# Patient Record
Sex: FEMALE | Race: WHITE | HISPANIC OR LATINO
[De-identification: names, ages, dates, MRNs, and addresses within clinical notes are randomized per-mention and may not be internally consistent; named-entity substitution may affect disease eponyms.]

---

## 2019-01-01 ENCOUNTER — TRANSCRIPTION ENCOUNTER (OUTPATIENT)
Age: 54
End: 2019-01-01

## 2019-06-13 ENCOUNTER — OUTPATIENT (OUTPATIENT)
Dept: OUTPATIENT SERVICES | Facility: HOSPITAL | Age: 54
LOS: 1 days | Discharge: HOME | End: 2019-06-13
Payer: COMMERCIAL

## 2019-06-13 VITALS
HEIGHT: 60 IN | WEIGHT: 150.8 LBS | DIASTOLIC BLOOD PRESSURE: 70 MMHG | RESPIRATION RATE: 18 BRPM | HEART RATE: 77 BPM | OXYGEN SATURATION: 97 % | SYSTOLIC BLOOD PRESSURE: 136 MMHG | TEMPERATURE: 97 F

## 2019-06-13 DIAGNOSIS — N60.09 SOLITARY CYST OF UNSPECIFIED BREAST: Chronic | ICD-10-CM

## 2019-06-13 DIAGNOSIS — Z98.890 OTHER SPECIFIED POSTPROCEDURAL STATES: Chronic | ICD-10-CM

## 2019-06-13 DIAGNOSIS — Z01.818 ENCOUNTER FOR OTHER PREPROCEDURAL EXAMINATION: ICD-10-CM

## 2019-06-13 DIAGNOSIS — M66.232 SPONTANEOUS RUPTURE OF EXTENSOR TENDONS, LEFT FOREARM: ICD-10-CM

## 2019-06-13 LAB
ALBUMIN SERPL ELPH-MCNC: 4.3 G/DL — SIGNIFICANT CHANGE UP (ref 3.5–5.2)
ALP SERPL-CCNC: 105 U/L — SIGNIFICANT CHANGE UP (ref 30–115)
ALT FLD-CCNC: 40 U/L — SIGNIFICANT CHANGE UP (ref 0–41)
ANION GAP SERPL CALC-SCNC: 11 MMOL/L — SIGNIFICANT CHANGE UP (ref 7–14)
APTT BLD: 34.7 SEC — SIGNIFICANT CHANGE UP (ref 27–39.2)
AST SERPL-CCNC: 24 U/L — SIGNIFICANT CHANGE UP (ref 0–41)
BASOPHILS # BLD AUTO: 0.02 K/UL — SIGNIFICANT CHANGE UP (ref 0–0.2)
BASOPHILS NFR BLD AUTO: 0.5 % — SIGNIFICANT CHANGE UP (ref 0–1)
BILIRUB SERPL-MCNC: 0.5 MG/DL — SIGNIFICANT CHANGE UP (ref 0.2–1.2)
BUN SERPL-MCNC: 12 MG/DL — SIGNIFICANT CHANGE UP (ref 10–20)
CALCIUM SERPL-MCNC: 9.9 MG/DL — SIGNIFICANT CHANGE UP (ref 8.5–10.1)
CHLORIDE SERPL-SCNC: 102 MMOL/L — SIGNIFICANT CHANGE UP (ref 98–110)
CO2 SERPL-SCNC: 28 MMOL/L — SIGNIFICANT CHANGE UP (ref 17–32)
CREAT SERPL-MCNC: 0.7 MG/DL — SIGNIFICANT CHANGE UP (ref 0.7–1.5)
EOSINOPHIL # BLD AUTO: 0.1 K/UL — SIGNIFICANT CHANGE UP (ref 0–0.7)
EOSINOPHIL NFR BLD AUTO: 2.5 % — SIGNIFICANT CHANGE UP (ref 0–8)
GLUCOSE SERPL-MCNC: 96 MG/DL — SIGNIFICANT CHANGE UP (ref 70–99)
HCT VFR BLD CALC: 41.9 % — SIGNIFICANT CHANGE UP (ref 37–47)
HGB BLD-MCNC: 13.7 G/DL — SIGNIFICANT CHANGE UP (ref 12–16)
IMM GRANULOCYTES NFR BLD AUTO: 0.3 % — SIGNIFICANT CHANGE UP (ref 0.1–0.3)
INR BLD: 1.11 RATIO — SIGNIFICANT CHANGE UP (ref 0.65–1.3)
LYMPHOCYTES # BLD AUTO: 1.02 K/UL — LOW (ref 1.2–3.4)
LYMPHOCYTES # BLD AUTO: 25.9 % — SIGNIFICANT CHANGE UP (ref 20.5–51.1)
MCHC RBC-ENTMCNC: 32.5 PG — HIGH (ref 27–31)
MCHC RBC-ENTMCNC: 32.7 G/DL — SIGNIFICANT CHANGE UP (ref 32–37)
MCV RBC AUTO: 99.3 FL — HIGH (ref 81–99)
MONOCYTES # BLD AUTO: 0.33 K/UL — SIGNIFICANT CHANGE UP (ref 0.1–0.6)
MONOCYTES NFR BLD AUTO: 8.4 % — SIGNIFICANT CHANGE UP (ref 1.7–9.3)
NEUTROPHILS # BLD AUTO: 2.46 K/UL — SIGNIFICANT CHANGE UP (ref 1.4–6.5)
NEUTROPHILS NFR BLD AUTO: 62.4 % — SIGNIFICANT CHANGE UP (ref 42.2–75.2)
NRBC # BLD: 0 /100 WBCS — SIGNIFICANT CHANGE UP (ref 0–0)
PLATELET # BLD AUTO: 214 K/UL — SIGNIFICANT CHANGE UP (ref 130–400)
POTASSIUM SERPL-MCNC: 4.5 MMOL/L — SIGNIFICANT CHANGE UP (ref 3.5–5)
POTASSIUM SERPL-SCNC: 4.5 MMOL/L — SIGNIFICANT CHANGE UP (ref 3.5–5)
PROT SERPL-MCNC: 7.1 G/DL — SIGNIFICANT CHANGE UP (ref 6–8)
PROTHROM AB SERPL-ACNC: 12.8 SEC — SIGNIFICANT CHANGE UP (ref 9.95–12.87)
RBC # BLD: 4.22 M/UL — SIGNIFICANT CHANGE UP (ref 4.2–5.4)
RBC # FLD: 12.1 % — SIGNIFICANT CHANGE UP (ref 11.5–14.5)
SODIUM SERPL-SCNC: 141 MMOL/L — SIGNIFICANT CHANGE UP (ref 135–146)
WBC # BLD: 3.94 K/UL — LOW (ref 4.8–10.8)
WBC # FLD AUTO: 3.94 K/UL — LOW (ref 4.8–10.8)

## 2019-06-13 PROCEDURE — 93010 ELECTROCARDIOGRAM REPORT: CPT

## 2019-06-13 PROCEDURE — 71046 X-RAY EXAM CHEST 2 VIEWS: CPT | Mod: 26

## 2019-06-13 NOTE — H&P PST ADULT - NSICDXPASTMEDICALHX_GEN_ALL_CORE_FT
PAST MEDICAL HISTORY:  Anxiety     Breast cancer Right s/p Lumpectomy and radx 7 months    Elevated liver enzymes     JEREMY on CPAP     Osteopenia

## 2019-06-13 NOTE — H&P PST ADULT - REASON FOR ADMISSION
Patient scheduled to PAST for left extensor carpal ulnaris release debridement under GA on 06/27/2019 at [facility] OR by DR. Phillips.

## 2019-06-13 NOTE — H&P PST ADULT - HISTORY OF PRESENT ILLNESS
Patient c/o left arm pain, numbness and tingling since 01/2019.   Patient denies any c/o cp, palpitations fever, cough, rashes or dysuria.  Ex tolerance of 2 FOS walk with out SOB.   Positive JEREMY.

## 2019-06-13 NOTE — H&P PST ADULT - NSICDXPASTSURGICALHX_GEN_ALL_CORE_FT
PAST SURGICAL HISTORY:  Benign cyst of breast bilateral    H/O removal of cyst left moddle finger    History of excision of pilonidal cyst     History of female sterilization     Status post right breast lumpectomy

## 2019-06-26 NOTE — ASU PATIENT PROFILE, ADULT - PSH
Benign cyst of breast  bilateral  H/O removal of cyst  left moddle finger  H/O tubal ligation    History of excision of pilonidal cyst    Status post right breast lumpectomy

## 2019-06-26 NOTE — ASU PATIENT PROFILE, ADULT - PMH
Anxiety    Breast cancer  Right s/p Lumpectomy and radx 7 months  Elevated liver enzymes    JEREMY on CPAP    Osteopenia

## 2019-06-27 ENCOUNTER — OUTPATIENT (OUTPATIENT)
Dept: OUTPATIENT SERVICES | Facility: HOSPITAL | Age: 54
LOS: 1 days | Discharge: HOME | End: 2019-06-27

## 2019-06-27 VITALS
DIASTOLIC BLOOD PRESSURE: 67 MMHG | SYSTOLIC BLOOD PRESSURE: 127 MMHG | TEMPERATURE: 99 F | RESPIRATION RATE: 18 BRPM | OXYGEN SATURATION: 98 % | HEART RATE: 74 BPM | WEIGHT: 150.8 LBS | HEIGHT: 60 IN

## 2019-06-27 VITALS
HEART RATE: 76 BPM | DIASTOLIC BLOOD PRESSURE: 86 MMHG | TEMPERATURE: 98 F | SYSTOLIC BLOOD PRESSURE: 139 MMHG | RESPIRATION RATE: 18 BRPM | OXYGEN SATURATION: 97 %

## 2019-06-27 DIAGNOSIS — N60.09 SOLITARY CYST OF UNSPECIFIED BREAST: Chronic | ICD-10-CM

## 2019-06-27 DIAGNOSIS — Z98.890 OTHER SPECIFIED POSTPROCEDURAL STATES: Chronic | ICD-10-CM

## 2019-06-27 DIAGNOSIS — M77.8 OTHER ENTHESOPATHIES, NOT ELSEWHERE CLASSIFIED: ICD-10-CM

## 2019-06-27 DIAGNOSIS — Z98.51 TUBAL LIGATION STATUS: Chronic | ICD-10-CM

## 2019-06-27 RX ORDER — MORPHINE SULFATE 50 MG/1
4 CAPSULE, EXTENDED RELEASE ORAL
Refills: 0 | Status: DISCONTINUED | OUTPATIENT
Start: 2019-06-27 | End: 2019-06-27

## 2019-06-27 RX ORDER — LETROZOLE 2.5 MG/1
1 TABLET, FILM COATED ORAL
Qty: 0 | Refills: 0 | DISCHARGE

## 2019-06-27 RX ORDER — PREGABALIN 225 MG/1
1 CAPSULE ORAL
Qty: 0 | Refills: 0 | DISCHARGE

## 2019-06-27 RX ORDER — SODIUM CHLORIDE 9 MG/ML
1000 INJECTION, SOLUTION INTRAVENOUS
Refills: 0 | Status: DISCONTINUED | OUTPATIENT
Start: 2019-06-27 | End: 2019-07-12

## 2019-06-27 RX ORDER — ERGOCALCIFEROL 1.25 MG/1
1 CAPSULE ORAL
Qty: 0 | Refills: 0 | DISCHARGE

## 2019-06-27 RX ORDER — OXYCODONE AND ACETAMINOPHEN 5; 325 MG/1; MG/1
2 TABLET ORAL ONCE
Refills: 0 | Status: DISCONTINUED | OUTPATIENT
Start: 2019-06-27 | End: 2019-06-27

## 2019-06-27 RX ORDER — ALPRAZOLAM 0.25 MG
1 TABLET ORAL
Qty: 0 | Refills: 0 | DISCHARGE

## 2019-06-27 RX ADMIN — OXYCODONE AND ACETAMINOPHEN 2 TABLET(S): 5; 325 TABLET ORAL at 16:47

## 2019-06-27 RX ADMIN — MORPHINE SULFATE 4 MILLIGRAM(S): 50 CAPSULE, EXTENDED RELEASE ORAL at 15:09

## 2019-06-27 RX ADMIN — SODIUM CHLORIDE 100 MILLILITER(S): 9 INJECTION, SOLUTION INTRAVENOUS at 14:50

## 2019-06-27 RX ADMIN — MORPHINE SULFATE 4 MILLIGRAM(S): 50 CAPSULE, EXTENDED RELEASE ORAL at 15:49

## 2019-07-03 DIAGNOSIS — Z88.1 ALLERGY STATUS TO OTHER ANTIBIOTIC AGENTS STATUS: ICD-10-CM

## 2019-07-03 DIAGNOSIS — M66.232 SPONTANEOUS RUPTURE OF EXTENSOR TENDONS, LEFT FOREARM: ICD-10-CM

## 2019-11-18 PROBLEM — M85.80 OTHER SPECIFIED DISORDERS OF BONE DENSITY AND STRUCTURE, UNSPECIFIED SITE: Chronic | Status: ACTIVE | Noted: 2019-06-13

## 2019-11-18 PROBLEM — R74.8 ABNORMAL LEVELS OF OTHER SERUM ENZYMES: Chronic | Status: ACTIVE | Noted: 2019-06-13

## 2019-11-18 PROBLEM — G47.33 OBSTRUCTIVE SLEEP APNEA (ADULT) (PEDIATRIC): Chronic | Status: ACTIVE | Noted: 2019-06-13

## 2019-11-18 PROBLEM — F41.9 ANXIETY DISORDER, UNSPECIFIED: Chronic | Status: ACTIVE | Noted: 2019-06-13

## 2019-11-18 PROBLEM — C50.919 MALIGNANT NEOPLASM OF UNSPECIFIED SITE OF UNSPECIFIED FEMALE BREAST: Chronic | Status: ACTIVE | Noted: 2019-06-13

## 2019-12-04 PROBLEM — Z00.00 ENCOUNTER FOR PREVENTIVE HEALTH EXAMINATION: Status: ACTIVE | Noted: 2019-12-04

## 2019-12-20 ENCOUNTER — APPOINTMENT (OUTPATIENT)
Dept: GYNECOLOGIC ONCOLOGY | Facility: CLINIC | Age: 54
End: 2019-12-20
Payer: COMMERCIAL

## 2019-12-20 VITALS
SYSTOLIC BLOOD PRESSURE: 122 MMHG | TEMPERATURE: 99.5 F | WEIGHT: 160 LBS | DIASTOLIC BLOOD PRESSURE: 76 MMHG | BODY MASS INDEX: 31.41 KG/M2 | HEIGHT: 60 IN

## 2019-12-20 DIAGNOSIS — D25.1 INTRAMURAL LEIOMYOMA OF UTERUS: ICD-10-CM

## 2019-12-20 DIAGNOSIS — D25.2 INTRAMURAL LEIOMYOMA OF UTERUS: ICD-10-CM

## 2019-12-20 DIAGNOSIS — Z87.891 PERSONAL HISTORY OF NICOTINE DEPENDENCE: ICD-10-CM

## 2019-12-20 DIAGNOSIS — D25.0 INTRAMURAL LEIOMYOMA OF UTERUS: ICD-10-CM

## 2019-12-20 PROCEDURE — 99205 OFFICE O/P NEW HI 60 MIN: CPT

## 2019-12-20 NOTE — ASSESSMENT
[FreeTextEntry1] : 54-year-old female  ( x2) with a history of uterine fibroids along with a history of breast cancer diagnosed 2018. She was treated with lumpectomy by Dr. Shaw, followed by radiation. She denies any pain vaginal bleeding or discharge, and requests BRCA testing. She is referred by Dr. Vidal ([place]) for  second opinion with regards to her uterine fibroids. The patient's workup included serial sonography which reveals stable uterine fibroids from July to 2019.\par Options for surgical management discussed. Procedures reviewed with patient included laparoscopic hysterectomy with bilateral salpingo-oophorectomy along with conservative management.\par The patient was informed that at this time her fibroid uterus is not symptomatic and appears to be stable and hence followup only is sufficient. A  repeat sonogram in 6-12 months is thus recommended. However BRCA testing was also discussed. The patient is interested in going forward with genetic testing. In the event that she tests positive for a deleterious  mutation,  risk reducing surgery will be recommended. I would than favor a laparoscopic approach for removing the uterus and both tubes and ovaries. The patient was informed to f/u with me after her BRCA testing is  back.\par \par \par

## 2019-12-20 NOTE — HISTORY OF PRESENT ILLNESS
[FreeTextEntry1] : 54-year-old female  ( x2) with a history of uterine fibroids along with a history of breast cancer diagnosed 2018. She was treated with lumpectomy by Dr. Shaw, followed by radiation. She denies any pain vaginal bleeding or discharge, and requests BRCA testing. She is referred by Dr. Vidal ([place]) for second opinion with regards to her uterine fibroids.\par

## 2019-12-20 NOTE — OB HISTORY
[Vaginal ___] : [unfilled] vaginal delivery(s) [Total Preg ___] : : [unfilled] [Full Term ___] : [unfilled] (full-term) [Menarche Age ____] : age at menarche was [unfilled] [Menopause  Age ____] : menopause occurred at age [unfilled]

## 2020-01-06 ENCOUNTER — MESSAGE (OUTPATIENT)
Age: 55
End: 2020-01-06

## 2020-08-25 NOTE — PACU DISCHARGE NOTE - AIRWAY PATENCY:
[address] [facility]  [address] [place] [address]. [facility] [address]  Phone: [phone]  Fax: [address] , DO      August 25, 2020    Patient: Neel Aleman   YOB: 1980   Date of Visit: 8/25/2020       To Whom it May Concern:    Lori Robb was seen in my clinic on 8/25/2020. He should be off work 25 AUG 2020 through 27 AUG 2020. He may return to work on Friday, 28 AUG 2020. If you have any questions or concerns, please don't hesitate to call.       Sincerely,         Lamonte Persaud, 
Satisfactory

## 2021-01-12 ENCOUNTER — NON-APPOINTMENT (OUTPATIENT)
Age: 56
End: 2021-01-12

## 2021-01-12 LAB — CYTOLOGY CVX/VAG DOC THIN PREP: NORMAL

## 2021-01-21 ENCOUNTER — LABORATORY RESULT (OUTPATIENT)
Age: 56
End: 2021-01-21

## 2021-01-23 ENCOUNTER — APPOINTMENT (OUTPATIENT)
Dept: OBGYN | Facility: CLINIC | Age: 56
End: 2021-01-23
Payer: COMMERCIAL

## 2021-01-23 VITALS
BODY MASS INDEX: 28.71 KG/M2 | SYSTOLIC BLOOD PRESSURE: 114 MMHG | WEIGHT: 156 LBS | OXYGEN SATURATION: 98 % | TEMPERATURE: 98.3 F | HEART RATE: 81 BPM | HEIGHT: 62 IN | DIASTOLIC BLOOD PRESSURE: 70 MMHG

## 2021-01-23 LAB
BILIRUB UR QL STRIP: NEGATIVE
CLARITY UR: CLEAR
COLLECTION METHOD: NORMAL
GLUCOSE UR-MCNC: NEGATIVE
HCG UR QL: 0.2 EU/DL
HGB UR QL STRIP.AUTO: NEGATIVE
KETONES UR-MCNC: NEGATIVE
LEUKOCYTE ESTERASE UR QL STRIP: NEGATIVE
NITRITE UR QL STRIP: NEGATIVE
PH UR STRIP: 5
PROT UR STRIP-MCNC: NEGATIVE
SP GR UR STRIP: 1.03

## 2021-01-23 PROCEDURE — 81003 URINALYSIS AUTO W/O SCOPE: CPT | Mod: QW

## 2021-01-23 PROCEDURE — 99072 ADDL SUPL MATRL&STAF TM PHE: CPT

## 2021-01-23 PROCEDURE — 99396 PREV VISIT EST AGE 40-64: CPT

## 2021-01-23 NOTE — DISCUSSION/SUMMARY
[FreeTextEntry1] : Bi lat mammo with sono\par TVS to evaluate fibroids and endometrial stripe\par Pt advised to f/u with Dr Vasquez and Dr Dave r/t hx of R breast Ca and letrazole\par RTO 6 mos,prn

## 2021-01-26 ENCOUNTER — APPOINTMENT (OUTPATIENT)
Dept: SURGERY | Facility: CLINIC | Age: 56
End: 2021-01-26
Payer: COMMERCIAL

## 2021-01-26 VITALS
HEIGHT: 60 IN | TEMPERATURE: 96 F | HEART RATE: 84 BPM | OXYGEN SATURATION: 98 % | WEIGHT: 156 LBS | BODY MASS INDEX: 30.63 KG/M2

## 2021-01-26 VITALS
OXYGEN SATURATION: 98 % | DIASTOLIC BLOOD PRESSURE: 65 MMHG | BODY MASS INDEX: 30.04 KG/M2 | WEIGHT: 153 LBS | SYSTOLIC BLOOD PRESSURE: 130 MMHG | HEIGHT: 60 IN | HEART RATE: 86 BPM

## 2021-01-26 PROCEDURE — 99072 ADDL SUPL MATRL&STAF TM PHE: CPT

## 2021-01-26 PROCEDURE — 99213 OFFICE O/P EST LOW 20 MIN: CPT

## 2021-01-26 NOTE — PHYSICAL EXAM
[Normocephalic] : normocephalic [Atraumatic] : atraumatic [Supple] : supple [No Supraclavicular Adenopathy] : no supraclavicular adenopathy [Examined in the supine and seated position] : examined in the supine and seated position [Symmetrical] : symmetrical [No dominant masses] : no dominant masses in right breast  [No dominant masses] : no dominant masses left breast [No Nipple Retraction] : no left nipple retraction [No Nipple Discharge] : no left nipple discharge [Breast Mass Right Breast ___cm] : no masses [Breast Mass Left Breast ___cm] : no masses [Breast Nipple Inversion] : nipples not inverted [Breast Nipple Retraction] : nipples not retracted [Breast Nipple Fissures] : nipples not fissured [Breast Nipple Flattening] : nipples not flattened [Breast Abnormal Lactation (Galactorrhea)] : no galactorrhea [Breast Abnormal Secretion Bloody Fluid] : no bloody discharge [Breast Abnormal Secretion Serous Fluid] : no serous discharge [Breast Abnormal Secretion Opalescent Fluid] : no milky discharge [No Axillary Lymphadenopathy] : no left axillary lymphadenopathy [No Rashes] : no rashes [No Ulceration] : no ulceration

## 2021-01-26 NOTE — DATA REVIEWED
[FreeTextEntry1] : Patient's previous mammography reports were reviewed.  Patient is due for her mammogram and February of this year.  Previous pathology reports were noted.  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  The DCIS was very close to the superior margin.  The tumor was estrogen receptor positive and progesterone receptor negative.

## 2021-01-26 NOTE — PAST MEDICAL HISTORY
[Menarche Age ____] : age at menarche was [unfilled] [Menopause Age____] : age at menopause was [unfilled] [Total Preg ___] : G[unfilled] [Live Births ___] : P[unfilled]  [Abortions ___] : Abortions:[unfilled] [Age At Live Birth ___] : Age at live birth: [unfilled] [History of Hormone Replacement Treatment] : has no history of hormone replacement treatment

## 2021-01-26 NOTE — REVIEW OF SYSTEMS
[Negative] : Constitutional [Abdominal Pain] : no abdominal pain [Constipation] : no constipation [Diarrhea] : no diarrhea [Heartburn] : no heartburn

## 2021-01-26 NOTE — ASSESSMENT
[FreeTextEntry1] : After examination patient was explained about her pathology report from 2018 in detail.  She was explained about estrogen receptor and progesterone receptor as well.  She was explained about the superior margin being close to the specimen.  She will be seeing oncologist in couple of weeks.  Follow-up with oncologist explained to the patient.  Yearly mammography and possible MRI in the future explained as well.  Follow-up with oncologist and in the office as needed.

## 2021-01-26 NOTE — CONSULT LETTER
[Dear  ___] : Dear  [unfilled], [Courtesy Letter:] : I had the pleasure of seeing your patient, [unfilled], in my office today. [Please see my note below.] : Please see my note below. [Sincerely,] : Sincerely, [FreeTextEntry3] : Dani Vasquez MD, FACS\par [facility] [place] [facility] [place] [address]\par

## 2021-01-26 NOTE — HISTORY OF PRESENT ILLNESS
[FreeTextEntry1] : Patient presents to the office for the examination of the breast and discussion regarding the follow-up treatment of the right breast cancer.  Patient had surgery for right breast DCIS in 2018 after needle localization.  History of previous surgery on the right breast as well as left breast.  Patient is on medication for the DCIS.  She had radiation after the surgery as well.

## 2021-02-18 ENCOUNTER — APPOINTMENT (OUTPATIENT)
Dept: HEMATOLOGY ONCOLOGY | Facility: CLINIC | Age: 56
End: 2021-02-18

## 2021-02-22 ENCOUNTER — APPOINTMENT (OUTPATIENT)
Dept: HEMATOLOGY ONCOLOGY | Facility: CLINIC | Age: 56
End: 2021-02-22
Payer: COMMERCIAL

## 2021-02-22 ENCOUNTER — OUTPATIENT (OUTPATIENT)
Dept: OUTPATIENT SERVICES | Facility: HOSPITAL | Age: 56
LOS: 1 days | Discharge: HOME | End: 2021-02-22

## 2021-02-22 VITALS
WEIGHT: 152 LBS | HEIGHT: 60 IN | BODY MASS INDEX: 29.84 KG/M2 | DIASTOLIC BLOOD PRESSURE: 66 MMHG | SYSTOLIC BLOOD PRESSURE: 133 MMHG | TEMPERATURE: 98.4 F | HEART RATE: 79 BPM

## 2021-02-22 DIAGNOSIS — N60.09 SOLITARY CYST OF UNSPECIFIED BREAST: Chronic | ICD-10-CM

## 2021-02-22 DIAGNOSIS — Z98.890 OTHER SPECIFIED POSTPROCEDURAL STATES: Chronic | ICD-10-CM

## 2021-02-22 DIAGNOSIS — Z98.51 TUBAL LIGATION STATUS: Chronic | ICD-10-CM

## 2021-02-22 PROCEDURE — 99204 OFFICE O/P NEW MOD 45 MIN: CPT

## 2021-02-23 ENCOUNTER — NON-APPOINTMENT (OUTPATIENT)
Age: 56
End: 2021-02-23

## 2021-02-24 RX ORDER — ALBUTEROL SULFATE 90 UG/1
108 (90 BASE) AEROSOL, METERED RESPIRATORY (INHALATION)
Qty: 54 | Refills: 0 | Status: DISCONTINUED | COMMUNITY
Start: 2020-12-29 | End: 2021-02-24

## 2021-02-24 RX ORDER — ALPRAZOLAM 0.25 MG/1
0.25 TABLET ORAL
Refills: 0 | Status: DISCONTINUED | COMMUNITY
End: 2021-02-24

## 2021-02-24 RX ORDER — ALPRAZOLAM 1 MG/1
1 TABLET ORAL
Qty: 90 | Refills: 0 | Status: ACTIVE | COMMUNITY
Start: 2020-12-08

## 2021-02-24 RX ORDER — LIDOCAINE 5 G/100G
5 OINTMENT TOPICAL
Refills: 0 | Status: DISCONTINUED | COMMUNITY
End: 2021-02-24

## 2021-02-24 RX ORDER — LETROZOLE 2.5 MG/1
TABLET, FILM COATED ORAL
Refills: 0 | Status: DISCONTINUED | COMMUNITY
End: 2021-02-24

## 2021-02-24 RX ORDER — LIDOCAINE 5 G/100G
5 OINTMENT TOPICAL
Qty: 35 | Refills: 0 | Status: DISCONTINUED | COMMUNITY
Start: 2020-12-29 | End: 2021-02-24

## 2021-02-24 RX ORDER — LETROZOLE TABLETS 2.5 MG/1
TABLET, FILM COATED ORAL
Refills: 0 | Status: DISCONTINUED | COMMUNITY
End: 2021-02-24

## 2021-02-24 RX ORDER — CYCLOBENZAPRINE HYDROCHLORIDE 10 MG/1
10 TABLET, FILM COATED ORAL
Qty: 30 | Refills: 0 | Status: DISCONTINUED | COMMUNITY
Start: 2020-06-22 | End: 2021-02-24

## 2021-02-27 NOTE — CONSULT LETTER
[Dear  ___] : Dear  [unfilled], [Consult Letter:] : I had the pleasure of evaluating your patient, [unfilled]. [Please see my note below.] : Please see my note below. [Consult Closing:] : Thank you very much for allowing me to participate in the care of this patient.  If you have any questions, please do not hesitate to contact me. [Sincerely,] : Sincerely, [DrRon  ___] : Dr. VERDE [FreeTextEntry3] : Len Dave MD\par Professor [employer]\par [place]\par Attending Physician\par [facility] [place]\par [phone]\par \par

## 2021-02-27 NOTE — PHYSICAL EXAM
[Fully active, able to carry on all pre-disease performance without restriction] : Status 0 - Fully active, able to carry on all pre-disease performance without restriction [Normal] : grossly intact [de-identified] : b/l breasts symmetrical with no palpable masses , well healed scar on right breast . NO palpable axillary lymphadenopathy

## 2021-02-27 NOTE — HISTORY OF PRESENT ILLNESS
[de-identified] : 55 y o f with PMH of anxiety , JEREMY and high grade ductal carcinoma in situ diagnosed in 2018 , s/p lumpectomy and RT and currently on endocrine therapy presented today to establish her care . Pt reports that screening mammogram done in Feb , 2018 , showed right breast architectural distortion . She than had US guided biopsy of right breast which showed DCIS . Pt was then referred to surgery for further management and had lumpectomy of right breast that showed DCIS high nuclear grade , very  close to superior margin , ER positive , NE negative and Ki67 of 5-10%. Pt received RT to right breast .\par Pt has been taking letrozole since then . Pt is uptodate with screening mammograms, last mammogram was done in Feb 2021 that showed no evidence of disease . Pt does not recall having a recent dexa scan. Does take calcium and Vit D supplements . \par \par Pt also reports having difficulty swallowing , reports that she had esophageal dilatation about 20 years ago when her symptoms got worse . More recently she noted noted worsening dysphagia so was seen by GI and was recommended to get esophagram . Denies any weight loss or loss of appetite .\par Pt has paternal aunt and cousin with breast cancers , she had genetic testing done and results were negative  . Works in a [employer] firm and has very active life style . \par Denies h/o smoking , alcohol or drug use .\par

## 2021-03-02 ENCOUNTER — RESULT REVIEW (OUTPATIENT)
Age: 56
End: 2021-03-02

## 2021-03-02 ENCOUNTER — OUTPATIENT (OUTPATIENT)
Dept: OUTPATIENT SERVICES | Facility: HOSPITAL | Age: 56
LOS: 1 days | Discharge: HOME | End: 2021-03-02

## 2021-03-02 DIAGNOSIS — Z98.890 OTHER SPECIFIED POSTPROCEDURAL STATES: Chronic | ICD-10-CM

## 2021-03-02 DIAGNOSIS — Z98.51 TUBAL LIGATION STATUS: Chronic | ICD-10-CM

## 2021-03-02 DIAGNOSIS — N60.09 SOLITARY CYST OF UNSPECIFIED BREAST: Chronic | ICD-10-CM

## 2021-03-11 DIAGNOSIS — M89.9 DISORDER OF BONE, UNSPECIFIED: ICD-10-CM

## 2021-03-11 DIAGNOSIS — Z13.820 ENCOUNTER FOR SCREENING FOR OSTEOPOROSIS: ICD-10-CM

## 2021-03-11 DIAGNOSIS — Z78.0 ASYMPTOMATIC MENOPAUSAL STATE: ICD-10-CM

## 2021-03-22 DIAGNOSIS — Z85.3 PERSONAL HISTORY OF MALIGNANT NEOPLASM OF BREAST: ICD-10-CM

## 2021-07-02 ENCOUNTER — TRANSCRIPTION ENCOUNTER (OUTPATIENT)
Age: 56
End: 2021-07-02

## 2021-08-19 ENCOUNTER — TRANSCRIPTION ENCOUNTER (OUTPATIENT)
Age: 56
End: 2021-08-19

## 2021-08-25 ENCOUNTER — APPOINTMENT (OUTPATIENT)
Dept: HEMATOLOGY ONCOLOGY | Facility: CLINIC | Age: 56
End: 2021-08-25
Payer: COMMERCIAL

## 2021-08-25 ENCOUNTER — OUTPATIENT (OUTPATIENT)
Dept: OUTPATIENT SERVICES | Facility: HOSPITAL | Age: 56
LOS: 1 days | Discharge: HOME | End: 2021-08-25

## 2021-08-25 VITALS
BODY MASS INDEX: 31.41 KG/M2 | TEMPERATURE: 97.6 F | DIASTOLIC BLOOD PRESSURE: 73 MMHG | WEIGHT: 160 LBS | HEART RATE: 73 BPM | SYSTOLIC BLOOD PRESSURE: 132 MMHG | HEIGHT: 60 IN

## 2021-08-25 DIAGNOSIS — N60.09 SOLITARY CYST OF UNSPECIFIED BREAST: Chronic | ICD-10-CM

## 2021-08-25 DIAGNOSIS — Z98.51 TUBAL LIGATION STATUS: Chronic | ICD-10-CM

## 2021-08-25 DIAGNOSIS — Z98.890 OTHER SPECIFIED POSTPROCEDURAL STATES: Chronic | ICD-10-CM

## 2021-08-25 PROCEDURE — 99214 OFFICE O/P EST MOD 30 MIN: CPT

## 2021-08-27 NOTE — PHYSICAL EXAM
[Fully active, able to carry on all pre-disease performance without restriction] : Status 0 - Fully active, able to carry on all pre-disease performance without restriction [Normal] : supple without JVD, no thyromegaly or masses appreciated [de-identified] : bilateral breasts symmetrical with no palpable masses , well healed scar on right breast, no palpable axillary lymphadenopathy

## 2021-08-27 NOTE — HISTORY OF PRESENT ILLNESS
[de-identified] : 55 y o f with PMH of anxiety , JEREMY and high grade ductal carcinoma in situ diagnosed in 2018 , s/p lumpectomy and RT and currently on endocrine therapy presented today to establish her care . Pt reports that screening mammogram done in Feb , 2018 , showed right breast architectural distortion . She than had US guided biopsy of right breast which showed DCIS . Pt was then referred to surgery for further management and had lumpectomy of right breast that showed DCIS high nuclear grade , very  close to superior margin , ER positive , NV negative and Ki67 of 5-10%. Pt received RT to right breast .\par Pt has been taking letrozole since then . Pt is uptodate with screening mammograms, last mammogram was done in Feb 2021 that showed no evidence of disease . Pt does not recall having a recent dexa scan. Does take calcium and Vit D supplements . \par \par Pt also reports having difficulty swallowing , reports that she had esophageal dilatation about 20 years ago when her symptoms got worse . More recently she noted noted worsening dysphagia so was seen by GI and was recommended to get esophagram . Denies any weight loss or loss of appetite .\par Pt has paternal aunt and cousin with breast cancers , she had genetic testing done and results were negative  . 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 \par Denies h/o smoking , alcohol or drug use .\par  [de-identified] : 8/25/2021\par Patient is here to follow up for DCIS\par She is compliant with Letrozole since 5/2018, tolerating well\par She takes Vitamin D and Calcium\par She denies any new breast lump/mass, skin changes or nipple discharge, no muscle or bone pains\par She went for esophagram with Dr Gomez but was not able to tolerate and complete the procedure. She is seeing Dr Gomez in a couple of months for colonoscopy\par She is UTD with mammogram and f/u with Dr Farris\par \par

## 2021-08-27 NOTE — CONSULT LETTER
[Dear  ___] : Dear  [unfilled], [Consult Letter:] : I had the pleasure of evaluating your patient, [unfilled]. [Please see my note below.] : Please see my note below. [Consult Closing:] : Thank you very much for allowing me to participate in the care of this patient.  If you have any questions, please do not hesitate to contact me. [Sincerely,] : Sincerely, [DrRon  ___] : Dr. VERDE [FreeTextEntry3] : Len Dave MD\par Professor [employer]\par [place]\par Attending Physician\par [facility] [place]\par [phone]\par \par

## 2021-08-27 NOTE — ASSESSMENT
[FreeTextEntry1] : # DCIS with high nuclear grade very close to superior margin  / ER + walter / OR -walter / Ki 67 5-10% diagnosed in 2018 \par at [facility] on s/p lumpectomy and RT, currently on Letrozole, started in May, 2018.\par \par Patient has dysphagia to solids, chronic likely from Achalasia. She had esophagram with Dr Gomez which she did not tolerate and was not completed.\par \par --Discussed in detail with pt about treatment and surveillance of DCIS. She was told that endocrine therapy will be for 5 years in total .\par --continue Letrozole - pt has refills\par The following adverse drug reactions were discussed with the patient: edema, flushing, diaphoresis, night sweats, hot flash, hypercholesterolemia, weight gain, nausea, dizziness, fatigue, arthralgia, arthritis, asthenia, back pain, bone fracture, ostealgia, osteoporosis, cough, dyspnea\par She will continue Vitamin D and Calcium while on AI\par --She will continue with screening mammograms.\par \par #Osteopenia\par -- Recommended Prolia 60 mg SQ every 6 months. Discussed starting Prolia once she gets dental clearance. Patient agreed to plan. Dental clearance form provided to patient.\par Significant adverse reactions include atypical femur fractures, hypocalcemia, infections and osteonecrosis of the jaw.\par Adverse Reactions include but not limited to: Peripheral edema, dermatitis, eczema, skin rash, hypocalcemia, hypophosphatemia, headache, arthralgia, back pain, limb pain, upper respiratory tract infection, angina pectoris, hypertension, hypercholesterolemia, constipation, dyspepsia, flatulence, upper abdominal pain, vomiting, Urinary tract infection, dizziness, falling, sciatica, musculoskeletal pain, myalgia, ostealgia, osteonecrosis of the jaw, polymyalgia rheumatica , vertebral column fracture, bronchitis, nasopharyngitis\par -- Advised weight bearing exercises\par -- Continue Vitamin D and Calcium\par \par Continue to follow up with gyne and PCP for health maintenance\par Follow up with GI for further evaluation of dysphagia and colonoscopy\par \par RTC in 6 months\par \par Pt was seen and examined with Dr Dave who agreed with plan of care .\par \par

## 2021-08-27 NOTE — RESULTS/DATA
[FreeTextEntry1] : Mammogram on 2/10/2021 was negative\par \par EXAM:  MG XR BONE DENSITY AXIAL\par PROCEDURE DATE:  03/02/2021\par =================================================================\par Bone Density Report\par =================================================================\par Age:           55\par Sex:           Female\par Ethnicity:     \par -----------------------------------------------------------------\par Indication: postmenopausal; screening for osteoporosis;\par \par Accession number: [id number]\par \par Bone Density:\par -----------------------------------------------------------------\par Region                   BMD    T-score  Z-score   Classification\par -----------------------------------------------------------------\par AP Spine (L1-L4)         0.802   -2.2     -1.0     Osteopenia\par Femoral Neck (Left)      0.643   -1.9     -0.8     Osteopenia\par Total Hip (Left)         0.807   -1.1     -0.3     Osteopenia\par -----------------------------------------------------------------\par \par \par

## 2021-08-30 DIAGNOSIS — M85.852 OTHER SPECIFIED DISORDERS OF BONE DENSITY AND STRUCTURE, LEFT THIGH: ICD-10-CM

## 2021-08-30 DIAGNOSIS — M85.88 OTHER SPECIFIED DISORDERS OF BONE DENSITY AND STRUCTURE, OTHER SITE: ICD-10-CM

## 2021-08-30 DIAGNOSIS — Z85.3 PERSONAL HISTORY OF MALIGNANT NEOPLASM OF BREAST: ICD-10-CM

## 2021-08-30 DIAGNOSIS — Z79.811 LONG TERM (CURRENT) USE OF AROMATASE INHIBITORS: ICD-10-CM

## 2021-10-16 ENCOUNTER — TRANSCRIPTION ENCOUNTER (OUTPATIENT)
Age: 56
End: 2021-10-16

## 2021-11-05 ENCOUNTER — TRANSCRIPTION ENCOUNTER (OUTPATIENT)
Age: 56
End: 2021-11-05

## 2022-02-15 ENCOUNTER — NON-APPOINTMENT (OUTPATIENT)
Age: 57
End: 2022-02-15

## 2022-02-18 ENCOUNTER — APPOINTMENT (OUTPATIENT)
Age: 57
End: 2022-02-18
Payer: COMMERCIAL

## 2022-02-18 VITALS
HEIGHT: 60 IN | BODY MASS INDEX: 31.41 KG/M2 | RESPIRATION RATE: 12 BRPM | DIASTOLIC BLOOD PRESSURE: 60 MMHG | HEART RATE: 78 BPM | WEIGHT: 160 LBS | SYSTOLIC BLOOD PRESSURE: 120 MMHG | OXYGEN SATURATION: 94 %

## 2022-02-18 DIAGNOSIS — R06.02 SHORTNESS OF BREATH: ICD-10-CM

## 2022-02-18 DIAGNOSIS — Z80.3 FAMILY HISTORY OF MALIGNANT NEOPLASM OF BREAST: ICD-10-CM

## 2022-02-18 DIAGNOSIS — Z85.3 PERSONAL HISTORY OF MALIGNANT NEOPLASM OF BREAST: ICD-10-CM

## 2022-02-18 DIAGNOSIS — Z87.42 PERSONAL HISTORY OF OTHER DISEASES OF THE FEMALE GENITAL TRACT: ICD-10-CM

## 2022-02-18 DIAGNOSIS — Z80.1 FAMILY HISTORY OF MALIGNANT NEOPLASM OF TRACHEA, BRONCHUS AND LUNG: ICD-10-CM

## 2022-02-18 PROCEDURE — 71046 X-RAY EXAM CHEST 2 VIEWS: CPT

## 2022-02-18 PROCEDURE — 99203 OFFICE O/P NEW LOW 30 MIN: CPT | Mod: 25

## 2022-02-18 NOTE — PROCEDURE
[FreeTextEntry1] : CXR PA / LAT SOB\par \par NO INFILTRATES/ EFFUSION\par \par IMPRESSION\par \par NAPD

## 2022-02-18 NOTE — HISTORY OF PRESENT ILLNESS
[Shortness of Breath] : Shortness of Breath [Dyspnea] : dyspnea [Fatigue] : fatigue [Weakness] : weakness [Cough] : cough [Weight Gain] : weight gain [Leg Pain] : no leg pain [Edema] : no edema [TextBox_4] : SOB ON EXERTION, EX SMOKER, SAW PMD WAS TOLD COPD/ WAS GIVEN BREO, REPORTS HX OF RECURRENT BRONCHITIS

## 2022-02-18 NOTE — DISCUSSION/SUMMARY
[FreeTextEntry1] : SOB ON EXERTION,  EX SMOKER, RECURRENT BRONCHITIS\par RO COPD\par FAMILY HX OF LLUNG CANCER\par CHEST CT SCREENING\par WEIGHT LOSS

## 2022-03-02 ENCOUNTER — TRANSCRIPTION ENCOUNTER (OUTPATIENT)
Age: 57
End: 2022-03-02

## 2022-03-03 ENCOUNTER — APPOINTMENT (OUTPATIENT)
Dept: HEMATOLOGY ONCOLOGY | Facility: CLINIC | Age: 57
End: 2022-03-03
Payer: COMMERCIAL

## 2022-03-03 ENCOUNTER — OUTPATIENT (OUTPATIENT)
Dept: OUTPATIENT SERVICES | Facility: HOSPITAL | Age: 57
LOS: 1 days | Discharge: HOME | End: 2022-03-03

## 2022-03-03 VITALS
SYSTOLIC BLOOD PRESSURE: 128 MMHG | HEIGHT: 60 IN | HEART RATE: 91 BPM | BODY MASS INDEX: 31.02 KG/M2 | TEMPERATURE: 98.3 F | WEIGHT: 158 LBS | DIASTOLIC BLOOD PRESSURE: 79 MMHG | RESPIRATION RATE: 18 BRPM

## 2022-03-03 DIAGNOSIS — Z98.890 OTHER SPECIFIED POSTPROCEDURAL STATES: Chronic | ICD-10-CM

## 2022-03-03 DIAGNOSIS — N60.09 SOLITARY CYST OF UNSPECIFIED BREAST: Chronic | ICD-10-CM

## 2022-03-03 DIAGNOSIS — Z98.51 TUBAL LIGATION STATUS: Chronic | ICD-10-CM

## 2022-03-03 PROCEDURE — 99214 OFFICE O/P EST MOD 30 MIN: CPT

## 2022-03-03 NOTE — ASSESSMENT
[FreeTextEntry1] : #Patient is a 56 year old female with DCIS with high nuclear grade very close to superior margin  / ER + walter / [place] -walter / Ki 67 5-10% diagnosed in 2018 at [facility] on s/p lumpectomy and RT, currently on Letrozole, started in May, 2018. Discussed in detail with patient about treatment and surveillance of DCIS. She was told that endocrine therapy will be for 5 years in total .\par \par Patient has dysphagia to solids, chronic likely from Achalasia. She had esophagram with Dr Gomez which she did not tolerate and was not completed. She did not go for repeat testing.\par \par Mammogram on 2/11/2022 was negative.\par Bone density on 3/2/2021 showed osteopenia on the spine, femur and hip.\par \par RECOMMENDATION:\par Previous notes reviewed and all relevant radiology results discussed with Dr Dave and communicated to the patient.\par \par -- Continue Letrozole - eRx sent.\par The following adverse drug reactions were discussed with the patient: edema, flushing, diaphoresis, night sweats, hot flash, hypercholesterolemia, weight gain, nausea, dizziness, fatigue, arthralgia, arthritis, asthenia, back pain, bone fracture, ostealgia, osteoporosis, cough, dyspnea\par She will continue Vitamin D and Calcium while on AI\par -- She will continue with screening mammograms.\par -- Continue Vitamin D and Calcium supplement.\par Continue to follow up with gyne and PCP for health maintenance.\par Follow up with GI for further evaluation of dysphagia and colonoscopy.\par \par #Osteopenia\par -- Recommended Prolia 60 mg SQ every 6 months. Discussed starting Prolia once she gets dental clearance. \par Significant adverse reactions include atypical femur fractures, hypocalcemia, infections and osteonecrosis of the jaw.\par Adverse Reactions include but not limited to: Peripheral edema, dermatitis, eczema, skin rash, hypocalcemia, hypophosphatemia, headache, arthralgia, back pain, limb pain, upper respiratory tract infection, angina pectoris, hypertension, hypercholesterolemia, constipation, dyspepsia, flatulence, upper abdominal pain, vomiting, Urinary tract infection, dizziness, falling, sciatica, musculoskeletal pain, myalgia, ostealgia, osteonecrosis of the jaw, polymyalgia rheumatica , vertebral column fracture, bronchitis, nasopharyngitis\par -- Advised weight bearing exercises.\par -- Continue Vitamin D and Calcium.\par -- Next bone density due on 3/2023.\par \par RTC in 6 months\par \par Case was seen and discussed with Dr. Dave who agreed with assessment and plan.\par

## 2022-03-03 NOTE — PHYSICAL EXAM
[Fully active, able to carry on all pre-disease performance without restriction] : Status 0 - Fully active, able to carry on all pre-disease performance without restriction [Normal] : no peripheral adenopathy appreciated [de-identified] : bilateral breasts with no palpable masses, well healed scar on right breast, no palpable axillary lymphadenopathy.

## 2022-03-03 NOTE — HISTORY OF PRESENT ILLNESS
[de-identified] : 55 y o f with PMH of anxiety , JEREMY and high grade ductal carcinoma in situ diagnosed in 2018 , s/p lumpectomy and RT and currently on endocrine therapy presented today to establish her care . Pt reports that screening mammogram done in Feb , 2018 , showed right breast architectural distortion . She than had US guided biopsy of right breast which showed DCIS . Pt was then referred to surgery for further management and had lumpectomy of right breast that showed DCIS high nuclear grade , very  close to superior margin , ER positive , [place] negative and Ki67 of 5-10%. Pt received RT to right breast .\par Pt has been taking letrozole since then . Pt is uptodate with screening mammograms, last mammogram was done in Feb 2021 that showed no evidence of disease . Pt does not recall having a recent dexa scan. Does take calcium and Vit D supplements . \par \par Pt also reports having difficulty swallowing , reports that she had esophageal dilatation about 20 years ago when her symptoms got worse . More recently she noted noted worsening dysphagia so was seen by GI and was recommended to get esophagram . Denies any weight loss or loss of appetite .\par Pt has paternal aunt and cousin with breast cancers , she had genetic testing done and results were negative  . Works in a [employer] firm and has very active life style . \par Denies h/o smoking , alcohol or drug use .\par  [de-identified] : 8/25/2021\par Patient is here to follow up for DCIS\par She is compliant with Letrozole since 5/2018, tolerating well\par She takes Vitamin D and Calcium\par She denies any new breast lump/mass, skin changes or nipple discharge, no muscle or bone pains\par She went for esophagram with Dr Gomez but was not able to tolerate and complete the procedure. She is seeing Dr Gomez in a couple of months for colonoscopy\par She is UTD with mammogram and f/u with Dr Farris\par \par 3/3/2022\par Patient is here to follow up for DCIS.\par She is compliant with Letrozole since 5/2018, tolerating well.\par She takes Vitamin D and Calcium.\par She denies any new breast lump/mass, skin changes or nipple discharge, no muscle or bone pains.\par She was recommended Prolia for her osteopenia during the last visit and had discussed with her PCP and was advised to continue Vitamin and Calcium supplement and weight bearing exercise as per patient.\par She still gets dysphagia at times, did not go for repeat esophagram but she states that she gets anxious when it happens which make it worse.

## 2022-03-03 NOTE — CONSULT LETTER
[Dear  ___] : Dear  [unfilled], [Consult Letter:] : I had the pleasure of evaluating your patient, [unfilled]. [Please see my note below.] : Please see my note below. [Consult Closing:] : Thank you very much for allowing me to participate in the care of this patient.  If you have any questions, please do not hesitate to contact me. [Sincerely,] : Sincerely, [DrRon  ___] : Dr. VERDE [FreeTextEntry3] : Len Dave MD\par Professor [employer]\par [place]\par Attending Physician\par [facility] [place]\par [phone]\par \par

## 2022-03-04 DIAGNOSIS — M85.88 OTHER SPECIFIED DISORDERS OF BONE DENSITY AND STRUCTURE, OTHER SITE: ICD-10-CM

## 2022-03-04 DIAGNOSIS — Z79.811 LONG TERM (CURRENT) USE OF AROMATASE INHIBITORS: ICD-10-CM

## 2022-03-04 DIAGNOSIS — M85.852 OTHER SPECIFIED DISORDERS OF BONE DENSITY AND STRUCTURE, LEFT THIGH: ICD-10-CM

## 2022-03-04 DIAGNOSIS — Z85.3 PERSONAL HISTORY OF MALIGNANT NEOPLASM OF BREAST: ICD-10-CM

## 2022-03-05 ENCOUNTER — OUTPATIENT (OUTPATIENT)
Dept: OUTPATIENT SERVICES | Facility: HOSPITAL | Age: 57
LOS: 1 days | Discharge: HOME | End: 2022-03-05
Payer: COMMERCIAL

## 2022-03-05 ENCOUNTER — RESULT REVIEW (OUTPATIENT)
Age: 57
End: 2022-03-05

## 2022-03-05 DIAGNOSIS — Z98.890 OTHER SPECIFIED POSTPROCEDURAL STATES: Chronic | ICD-10-CM

## 2022-03-05 DIAGNOSIS — N60.09 SOLITARY CYST OF UNSPECIFIED BREAST: Chronic | ICD-10-CM

## 2022-03-05 DIAGNOSIS — R91.1 SOLITARY PULMONARY NODULE: ICD-10-CM

## 2022-03-05 DIAGNOSIS — Z98.51 TUBAL LIGATION STATUS: Chronic | ICD-10-CM

## 2022-03-05 PROCEDURE — 71271 CT THORAX LUNG CANCER SCR C-: CPT | Mod: 26

## 2022-03-29 ENCOUNTER — TRANSCRIPTION ENCOUNTER (OUTPATIENT)
Age: 57
End: 2022-03-29

## 2022-04-16 ENCOUNTER — TRANSCRIPTION ENCOUNTER (OUTPATIENT)
Age: 57
End: 2022-04-16

## 2022-04-30 ENCOUNTER — APPOINTMENT (OUTPATIENT)
Dept: OBGYN | Facility: CLINIC | Age: 57
End: 2022-04-30

## 2022-05-01 ENCOUNTER — LABORATORY RESULT (OUTPATIENT)
Age: 57
End: 2022-05-01

## 2022-05-16 ENCOUNTER — APPOINTMENT (OUTPATIENT)
Age: 57
End: 2022-05-16
Payer: COMMERCIAL

## 2022-05-16 PROCEDURE — 94010 BREATHING CAPACITY TEST: CPT

## 2022-05-16 PROCEDURE — 94727 GAS DIL/WSHOT DETER LNG VOL: CPT

## 2022-05-16 PROCEDURE — 94729 DIFFUSING CAPACITY: CPT

## 2022-05-17 ENCOUNTER — LABORATORY RESULT (OUTPATIENT)
Age: 57
End: 2022-05-17

## 2022-05-18 ENCOUNTER — APPOINTMENT (OUTPATIENT)
Dept: OBGYN | Facility: CLINIC | Age: 57
End: 2022-05-18
Payer: COMMERCIAL

## 2022-05-18 ENCOUNTER — LABORATORY RESULT (OUTPATIENT)
Age: 57
End: 2022-05-18

## 2022-05-18 VITALS
SYSTOLIC BLOOD PRESSURE: 122 MMHG | HEIGHT: 60 IN | BODY MASS INDEX: 32.39 KG/M2 | HEART RATE: 97 BPM | DIASTOLIC BLOOD PRESSURE: 70 MMHG | TEMPERATURE: 98.6 F | WEIGHT: 165 LBS | OXYGEN SATURATION: 89 %

## 2022-05-18 DIAGNOSIS — B97.7 PAPILLOMAVIRUS AS THE CAUSE OF DISEASES CLASSIFIED ELSEWHERE: ICD-10-CM

## 2022-05-18 DIAGNOSIS — Z01.419 ENCOUNTER FOR GYNECOLOGICAL EXAMINATION (GENERAL) (ROUTINE) W/OUT ABNORMAL FINDINGS: ICD-10-CM

## 2022-05-18 LAB
BILIRUB UR QL STRIP: NEGATIVE
CLARITY UR: CLEAR
COLLECTION METHOD: NORMAL
GLUCOSE UR-MCNC: NEGATIVE
HCG UR QL: 0.2 EU/DL
HGB UR QL STRIP.AUTO: NEGATIVE
KETONES UR-MCNC: NEGATIVE
LEUKOCYTE ESTERASE UR QL STRIP: NEGATIVE
NITRITE UR QL STRIP: NEGATIVE
PH UR STRIP: 5.5
PROT UR STRIP-MCNC: NEGATIVE
SP GR UR STRIP: 1.03

## 2022-05-18 PROCEDURE — 57454 BX/CURETT OF CERVIX W/SCOPE: CPT

## 2022-05-18 PROCEDURE — 99396 PREV VISIT EST AGE 40-64: CPT | Mod: 25

## 2022-05-18 PROCEDURE — 81003 URINALYSIS AUTO W/O SCOPE: CPT | Mod: QW

## 2022-05-18 NOTE — DISCUSSION/SUMMARY
[FreeTextEntry1] : 55yo, annual exam, h/o HR HPV, s/p colposcopy\par #annual\par -pap/hpv sent\par -colpo done, f/u results\par \par f/u in 1 yr or sooner PRN\par

## 2022-05-18 NOTE — COUNSELING
[Nutrition/ Exercise/ Weight Management] : nutrition, exercise, weight management [Lab Results] : lab results

## 2022-05-18 NOTE — PROCEDURE
[Colposcopy] : Colposcopy  [Risks] : risks [Benefits] : benefits [Alternatives] : alternatives [Patient] : patient [HPV High Risk] : HPV high risk [No Premedication] : no premedication [Colposcopy Adequate] : colposcopy adequate [Pap Performed] : pap performed [SCI Fully Visualized] : SCI fully visualized [Biopsy] : biopsy taken [Tolerated Well] : the patient tolerated the procedure well [de-identified] : 2 [de-identified] : acetowhite at 11 and 6 oclock [de-identified] : ECC\par 11 o'clock

## 2022-05-18 NOTE — HISTORY OF PRESENT ILLNESS
[Yes] : pregnancy [unknown] : Patient is unsure of the date of her LMP [FreeTextEntry1] : PATIENT PRESENT FOR ANNUAL GYN EXAM.\par \par No complaints\par 4 yrs s/p lumpectomy for breast ca in situ, taking letrozole, doing well, following yearly w/ heme onc\par no postmenopausal bleeding\par pt has history of HPV last 2x pap smears, had h/o HPV in the past and negative colpo, discussed recommendation to do colpo today. [Mammogramdate] : 2/11/22 [BreastSonogramDate] : 6/8/20 [PapSmeardate] : 1/23/21 [BoneDensityDate] : -0- [ColonoscopyDate] : 2/12/22 [TextBox_9] : 16 [TextBox_13] : 28 [TextBox_15] : 5

## 2022-05-19 PROBLEM — B97.7 HIGH RISK HPV INFECTION: Status: ACTIVE | Noted: 2022-05-19

## 2022-05-27 ENCOUNTER — NON-APPOINTMENT (OUTPATIENT)
Age: 57
End: 2022-05-27

## 2022-05-31 ENCOUNTER — APPOINTMENT (OUTPATIENT)
Age: 57
End: 2022-05-31
Payer: COMMERCIAL

## 2022-05-31 PROCEDURE — 99442: CPT

## 2022-05-31 NOTE — REASON FOR VISIT
[Home] : at home, [unfilled] , at the time of the visit. [Medical Office: ([facility])___] : at the medical office located in  [Verbal consent obtained from patient] : the patient, [unfilled] [Follow-Up] : a follow-up visit [COPD] : COPD [Shortness of Breath] : shortness of breath

## 2022-05-31 NOTE — DISCUSSION/SUMMARY
[FreeTextEntry1] : SOB ON EXERTION,  EX SMOKER, RECURRENT BRONCHITIS\par MILD REDUCTION IN DLCO\par FAMILY HX OF LUNG CANCER\par CHEST CT SCREENING YEARLY\par WEIGHT LOSS

## 2022-06-27 ENCOUNTER — APPOINTMENT (OUTPATIENT)
Dept: ORTHOPEDIC SURGERY | Facility: CLINIC | Age: 57
End: 2022-06-27
Payer: COMMERCIAL

## 2022-06-27 VITALS — HEIGHT: 60 IN | BODY MASS INDEX: 31.41 KG/M2 | WEIGHT: 160 LBS

## 2022-06-27 PROCEDURE — 99213 OFFICE O/P EST LOW 20 MIN: CPT

## 2022-06-27 NOTE — HISTORY OF PRESENT ILLNESS
[de-identified] : 57-year-old female with left elbow injury.  She comes in today for evaluation.  She has been in a brace.  She is still working from home.  She is comfortable.

## 2022-06-27 NOTE — ASSESSMENT
[FreeTextEntry1] :   Patient has sprained tear of the medial collateral ligament and the flexor pronator mass on the left elbow.  She is being treated conservatively for this.  She will see me back in 4-6 weeks.  She is going to start a therapy program.  The therapy program will emphasize flexion and rotation and extension to -30 degrees.

## 2022-06-27 NOTE — IMAGING
[de-identified] :   Patient has stiffness of flexion.  She can rotate the forearm well.  She can extend reasonably well.  The swelling ecchymoses is less now medially.  There is no erythema.  There is no instability.

## 2022-06-30 ENCOUNTER — NON-APPOINTMENT (OUTPATIENT)
Age: 57
End: 2022-06-30

## 2022-07-02 ENCOUNTER — NON-APPOINTMENT (OUTPATIENT)
Age: 57
End: 2022-07-02

## 2022-07-25 ENCOUNTER — APPOINTMENT (OUTPATIENT)
Dept: ORTHOPEDIC SURGERY | Facility: CLINIC | Age: 57
End: 2022-07-25

## 2022-07-25 VITALS — HEIGHT: 60 IN | WEIGHT: 160 LBS | BODY MASS INDEX: 31.41 KG/M2

## 2022-07-25 DIAGNOSIS — S53.422D: ICD-10-CM

## 2022-07-25 PROCEDURE — 99213 OFFICE O/P EST LOW 20 MIN: CPT

## 2022-07-25 NOTE — ASSESSMENT
[FreeTextEntry1] :   Patient has significant injury to her left elbow.  She recovered nicely.  Id she can stop wearing the brace.  She will see me back on an as-needed basis.  She can return back to normal activities.  Brace as tolerated.  Finish up therapy with a home program

## 2022-07-25 NOTE — HISTORY OF PRESENT ILLNESS
[de-identified] : 49-year-old female had a fall resulting injury to her left elbow.  It id she had pain discomfort the elbow.  Id she is feeling much better now.  Id she is in a brace.  She is doing therapy.  And has significant improvement.

## 2022-08-01 ENCOUNTER — APPOINTMENT (OUTPATIENT)
Dept: ORTHOPEDIC SURGERY | Facility: CLINIC | Age: 57
End: 2022-08-01

## 2022-08-19 ENCOUNTER — NON-APPOINTMENT (OUTPATIENT)
Age: 57
End: 2022-08-19

## 2022-09-15 ENCOUNTER — APPOINTMENT (OUTPATIENT)
Dept: HEMATOLOGY ONCOLOGY | Facility: CLINIC | Age: 57
End: 2022-09-15

## 2022-09-15 ENCOUNTER — OUTPATIENT (OUTPATIENT)
Dept: OUTPATIENT SERVICES | Facility: HOSPITAL | Age: 57
LOS: 1 days | Discharge: HOME | End: 2022-09-15

## 2022-09-15 VITALS
SYSTOLIC BLOOD PRESSURE: 134 MMHG | DIASTOLIC BLOOD PRESSURE: 60 MMHG | RESPIRATION RATE: 18 BRPM | WEIGHT: 165 LBS | HEIGHT: 60 IN | TEMPERATURE: 98.6 F | BODY MASS INDEX: 32.39 KG/M2 | HEART RATE: 84 BPM

## 2022-09-15 DIAGNOSIS — N60.09 SOLITARY CYST OF UNSPECIFIED BREAST: Chronic | ICD-10-CM

## 2022-09-15 DIAGNOSIS — Z98.890 OTHER SPECIFIED POSTPROCEDURAL STATES: Chronic | ICD-10-CM

## 2022-09-15 DIAGNOSIS — Z98.51 TUBAL LIGATION STATUS: Chronic | ICD-10-CM

## 2022-09-15 PROCEDURE — 99214 OFFICE O/P EST MOD 30 MIN: CPT

## 2022-09-19 ENCOUNTER — NON-APPOINTMENT (OUTPATIENT)
Age: 57
End: 2022-09-19

## 2022-09-21 NOTE — HISTORY OF PRESENT ILLNESS
[de-identified] : 55 y o f with PMH of anxiety , JEREMY and high grade ductal carcinoma in situ diagnosed in 2018 , s/p lumpectomy and RT and currently on endocrine therapy presented today to establish her care . Pt reports that screening mammogram done in Feb , 2018 , showed right breast architectural distortion . She than had US guided biopsy of right breast which showed DCIS . Pt was then referred to surgery for further management and had lumpectomy of right breast that showed DCIS high nuclear grade , very  close to superior margin , ER positive , ND negative and Ki67 of 5-10%. Pt received RT to right breast .\par Pt has been taking letrozole since then . Pt is uptodate with screening mammograms, last mammogram was done in Feb 2021 that showed no evidence of disease . Pt does not recall having a recent dexa scan. Does take calcium and Vit D supplements . \par \par Pt also reports having difficulty swallowing , reports that she had esophageal dilatation about 20 years ago when her symptoms got worse . More recently she noted noted worsening dysphagia so was seen by GI and was recommended to get esophagram . Denies any weight loss or loss of appetite .\par Pt has paternal aunt and cousin with breast cancers , she had genetic testing done and results were negative  . Works in a [employer] firm and has very active life style . \par Denies h/o smoking , alcohol or drug use .\par  [de-identified] : 8/25/2021\par Patient is here to follow up for DCIS\par She is compliant with Letrozole since 5/2018, tolerating well\par She takes Vitamin D and Calcium\par She denies any new breast lump/mass, skin changes or nipple discharge, no muscle or bone pains\par She went for esophagram with Dr Gomez but was not able to tolerate and complete the procedure. She is seeing Dr Gomez in a couple of months for colonoscopy\par She is UTD with mammogram and f/u with Dr Farris\par \par 3/3/2022\par Patient is here to follow up for DCIS.\par She is compliant with Letrozole since 5/2018, tolerating well.\par She takes Vitamin D and Calcium.\par She denies any new breast lump/mass, skin changes or nipple discharge, no muscle or bone pains.\par She was recommended Prolia for her osteopenia during the last visit and had discussed with her PCP and was advised to continue Vitamin and Calcium supplement and weight bearing exercise as per patient.\par She still gets dysphagia at times, did not go for repeat esophagram but she states that she gets anxious when it happens which make it worse.\par \par 9/15/22\par Patient is here to follow up for DCIS.\par She is compliant with Letrozole since 5/2018, tolerating well.\par She takes Vitamin D and Calcium.\par She denies any new breast lump/mass, skin changes or nipple discharge, no muscle or bone pains.\par She was recommended Prolia for her osteopenia during the last visit. As per pt she got clearance from her dentist but she has not started Prolia because she is concerned of the side effects of the meds. She did not bring copy of her dental clearance today, will try to find it at home. She states having difficulty taking large pills.\par She fell in May and sustained tear on the ligaments of her left elbow, s/p PT x 2 months, still recovering from it.\par She does not exercise regularly.

## 2022-09-21 NOTE — CONSULT LETTER
[Dear  ___] : Dear  [unfilled], [Consult Letter:] : I had the pleasure of evaluating your patient, [unfilled]. [Please see my note below.] : Please see my note below. [Consult Closing:] : Thank you very much for allowing me to participate in the care of this patient.  If you have any questions, please do not hesitate to contact me. [Sincerely,] : Sincerely, [DrRon  ___] : Dr. VERDE [FreeTextEntry3] : Len Dave MD\par Professor [employer]\par [place]\par Attending Physician\par [facility] [place]\par [phone]\par \par

## 2022-09-21 NOTE — PHYSICAL EXAM
[Fully active, able to carry on all pre-disease performance without restriction] : Status 0 - Fully active, able to carry on all pre-disease performance without restriction [Normal] : grossly intact [de-identified] : bilateral breasts with no palpable masses, well healed scar on right breast, no palpable axillary lymphadenopathy.

## 2022-09-21 NOTE — ASSESSMENT
[FreeTextEntry1] : Patient is a 57 year old female with DCIS with high nuclear grade very close to superior margin  / ER + walter / [place] -walter / Ki 67 5-10% diagnosed in 2018 at [facility] on s/p lumpectomy and RT, currently on Letrozole, started in May, 2018. Discussed in detail with patient about treatment and surveillance of DCIS. She was told that endocrine therapy will be for 5 years in total .\par \par Patient has dysphagia to solids, chronic likely from Achalasia. She had esophagram with Dr Gomez which she did not tolerate and was not completed. She did not go for repeat testing.\par \par Mammogram on 2/11/2022 was negative.\par Bone density on 3/2/2021 showed osteopenia on the spine, femur and hip.\par \par RECOMMENDATION:\par Previous notes reviewed and all relevant radiology results discussed with Dr Dave and communicated to the patient.\par \par -- Continue Letrozole, patient has refills.\par The following adverse drug reactions were discussed with the patient: edema, flushing, diaphoresis, night sweats, hot flash, hypercholesterolemia, weight gain, nausea, dizziness, fatigue, arthralgia, arthritis, asthenia, back pain, bone fracture, ostealgia, osteoporosis, cough, dyspnea\par She will continue Vitamin D and Calcium while on AI\par -- She will continue annual screening mammogram, script given.\par -- Continue Vitamin D and Calcium supplement.\par -- Continue to follow up with gyne and PCP for health maintenance.\par -- Follow up with GI for further evaluation of dysphagia and colonoscopy.\par \par #Osteopenia\par -- Recommended Prolia 60 mg SQ every 6 months. Discussed starting Prolia once she brings/send her dental clearance. \par Significant adverse reactions include atypical femur fractures, hypocalcemia, infections and osteonecrosis of the jaw.\par Adverse Reactions include but not limited to: Peripheral edema, dermatitis, eczema, skin rash, hypocalcemia, hypophosphatemia, headache, arthralgia, back pain, limb pain, upper respiratory tract infection, angina pectoris, hypertension, hypercholesterolemia, constipation, dyspepsia, flatulence, upper abdominal pain, vomiting, Urinary tract infection, dizziness, falling, sciatica, musculoskeletal pain, myalgia, ostealgia, osteonecrosis of the jaw, polymyalgia rheumatica , vertebral column fracture, bronchitis, nasopharyngitis\par -- Discussed also options of taking oral Biphosphonates but since she has difficulty taking large oral pill, she prefers Prolia which is injectable.\par -- Emphasized weight bearing exercises.\par -- Continue Vitamin D and Calcium.\par -- Next bone density due on 3/2023, script given.\par \par RTC in 6 months\par \par Case was seen and discussed with Dr. Dave who agreed with assessment and plan.\par

## 2022-09-27 DIAGNOSIS — Z79.811 LONG TERM (CURRENT) USE OF AROMATASE INHIBITORS: ICD-10-CM

## 2022-09-27 DIAGNOSIS — M85.88 OTHER SPECIFIED DISORDERS OF BONE DENSITY AND STRUCTURE, OTHER SITE: ICD-10-CM

## 2022-09-27 DIAGNOSIS — M85.852 OTHER SPECIFIED DISORDERS OF BONE DENSITY AND STRUCTURE, LEFT THIGH: ICD-10-CM

## 2022-10-25 RX ORDER — LETROZOLE TABLETS 2.5 MG/1
2.5 TABLET, FILM COATED ORAL
Qty: 90 | Refills: 3 | Status: ACTIVE | COMMUNITY
Start: 2021-02-22 | End: 1900-01-01

## 2022-11-07 ENCOUNTER — APPOINTMENT (OUTPATIENT)
Age: 57
End: 2022-11-07

## 2022-11-07 DIAGNOSIS — J44.9 CHRONIC OBSTRUCTIVE PULMONARY DISEASE, UNSPECIFIED: ICD-10-CM

## 2022-11-07 PROCEDURE — 99442: CPT

## 2022-11-07 NOTE — REASON FOR VISIT
[Medical Office: ([facility])___] : at the medical office located in  [Verbal consent obtained from patient] : the patient, [unfilled] [Follow-Up] : a follow-up visit [Asthma] : asthma [COPD] : COPD [Shortness of Breath] : shortness of breath

## 2022-11-07 NOTE — DISCUSSION/SUMMARY
[FreeTextEntry1] : SOB ON EXERTION,  EX SMOKER, RECURRENT BRONCHITIS\par MILD REDUCTION IN DLCO\par FAMILY HX OF LUNG CANCER\par CHEST CT SCREENING 3/22

## 2022-11-07 NOTE — HISTORY OF PRESENT ILLNESS
[TextBox_4] : EVENTS NOTED,  CHRONIC BRONCHITIS, EX SMOKER, STOPPED 7 Y AGO SP PFT/ CHEST CT DISCUSS RESULT

## 2023-01-04 ENCOUNTER — NON-APPOINTMENT (OUTPATIENT)
Age: 58
End: 2023-01-04

## 2023-03-28 ENCOUNTER — APPOINTMENT (OUTPATIENT)
Dept: HEMATOLOGY ONCOLOGY | Facility: CLINIC | Age: 58
End: 2023-03-28

## 2023-03-28 ENCOUNTER — OUTPATIENT (OUTPATIENT)
Dept: OUTPATIENT SERVICES | Facility: HOSPITAL | Age: 58
LOS: 1 days | End: 2023-03-28
Payer: COMMERCIAL

## 2023-03-28 ENCOUNTER — APPOINTMENT (OUTPATIENT)
Dept: HEMATOLOGY ONCOLOGY | Facility: CLINIC | Age: 58
End: 2023-03-28
Payer: COMMERCIAL

## 2023-03-28 VITALS
HEART RATE: 75 BPM | SYSTOLIC BLOOD PRESSURE: 133 MMHG | DIASTOLIC BLOOD PRESSURE: 79 MMHG | WEIGHT: 152 LBS | HEIGHT: 60 IN | BODY MASS INDEX: 29.84 KG/M2 | TEMPERATURE: 97.3 F | OXYGEN SATURATION: 99 %

## 2023-03-28 DIAGNOSIS — Z98.890 OTHER SPECIFIED POSTPROCEDURAL STATES: Chronic | ICD-10-CM

## 2023-03-28 DIAGNOSIS — Z85.3 PERSONAL HISTORY OF MALIGNANT NEOPLASM OF BREAST: ICD-10-CM

## 2023-03-28 DIAGNOSIS — Z98.51 TUBAL LIGATION STATUS: Chronic | ICD-10-CM

## 2023-03-28 DIAGNOSIS — N60.09 SOLITARY CYST OF UNSPECIFIED BREAST: Chronic | ICD-10-CM

## 2023-03-28 DIAGNOSIS — Z79.811 LONG TERM (CURRENT) USE OF AROMATASE INHIBITORS: ICD-10-CM

## 2023-03-28 PROCEDURE — 99214 OFFICE O/P EST MOD 30 MIN: CPT

## 2023-03-28 NOTE — CONSULT LETTER
[Dear  ___] : Dear  [unfilled], [Consult Letter:] : I had the pleasure of evaluating your patient, [unfilled]. [Please see my note below.] : Please see my note below. [Consult Closing:] : Thank you very much for allowing me to participate in the care of this patient.  If you have any questions, please do not hesitate to contact me. [Sincerely,] : Sincerely, [DrRon  ___] : Dr. VERDE [FreeTextEntry3] : Len Dave MD\par Professor [employer]\par [place]\par Attending Physician\par [facility] [place]\par [phone]\par \par

## 2023-03-28 NOTE — HISTORY OF PRESENT ILLNESS
[100: Normal, no complaints, no evidence of disease.] : 100: Normal, no complaints, no evidence of disease. [de-identified] : 55 y o f with PMH of anxiety , JEREMY and high grade ductal carcinoma in situ diagnosed in 2018 , s/p lumpectomy and RT and currently on endocrine therapy presented today to establish her care . Pt reports that screening mammogram done in Feb , 2018 , showed right breast architectural distortion . She than had US guided biopsy of right breast which showed DCIS . Pt was then referred to surgery for further management and had lumpectomy of right breast that showed DCIS high nuclear grade , very  close to superior margin , ER positive , [place] negative and Ki67 of 5-10%. Pt received RT to right breast .\par Pt has been taking letrozole since then . Pt is uptodate with screening mammograms, last mammogram was done in Feb 2021 that showed no evidence of disease . Pt does not recall having a recent dexa scan. Does take calcium and Vit D supplements . \par \par Pt also reports having difficulty swallowing , reports that she had esophageal dilatation about 20 years ago when her symptoms got worse . More recently she noted noted worsening dysphagia so was seen by GI and was recommended to get esophagram . Denies any weight loss or loss of appetite .\par Pt has paternal aunt and cousin with breast cancers , she had genetic testing done and results were negative  . Works in a [employer] firm and has very active life style . \par Denies h/o smoking , alcohol or drug use .\par  [de-identified] : 8/25/2021\par Patient is here to follow up for DCIS\par She is compliant with Letrozole since 5/2018, tolerating well\par She takes Vitamin D and Calcium\par She denies any new breast lump/mass, skin changes or nipple discharge, no muscle or bone pains\par She went for esophagram with Dr Gomez but was not able to tolerate and complete the procedure. She is seeing Dr Gomez in a couple of months for colonoscopy\par She is UTD with mammogram and f/u with Dr Farris\par \par 3/3/2022\par Patient is here to follow up for DCIS.\par She is compliant with Letrozole since 5/2018, tolerating well.\par She takes Vitamin D and Calcium.\par She denies any new breast lump/mass, skin changes or nipple discharge, no muscle or bone pains.\par She was recommended Prolia for her osteopenia during the last visit and had discussed with her PCP and was advised to continue Vitamin and Calcium supplement and weight bearing exercise as per patient.\par She still gets dysphagia at times, did not go for repeat esophagram but she states that she gets anxious when it happens which make it worse.\par \par 9/15/22\par Patient is here to follow up for DCIS.\par She is compliant with Letrozole since 5/2018, tolerating well.\par She takes Vitamin D and Calcium.\par She denies any new breast lump/mass, skin changes or nipple discharge, no muscle or bone pains.\par She was recommended Prolia for her osteopenia during the last visit. As per pt she got clearance from her dentist but she has not started Prolia because she is concerned of the side effects of the meds. She did not bring copy of her dental clearance today, will try to find it at home. She states having difficulty taking large pills.\par She fell in May and sustained tear on the ligaments of her left elbow, s/p PT x 2 months, still recovering from it.\par She does not exercise regularly.\par  3/28/23:\par Patient is here to follow up for DCIS.\par She is compliant with Letrozole since 5/2018, tolerating well.  She will complete 5 years of AI on 5/30/23.\par She takes Vitamin D and Calcium.\par She denies any new breast lump/mass, skin changes or nipple discharge, no muscle or bone pains.\par She started on  Prolia  in Dec, 2022 for her osteopenia. She received 1st dose in PMD office DR. Bartlett.\par We Communicated B/L breast Mammogram  from 3/7/23 with No Mammographic evidence of Malignancy.\par Also she had DEXA Scan  from 3/7/23 with Osteopenia  in the Spine , Femoral Neck and Hip. She started on Prolia. \par She walks like for 10, 0000 steps daily.

## 2023-03-28 NOTE — ASSESSMENT
[FreeTextEntry1] : Patient is a 57 year old female with DCIS with high nuclear grade very close to superior margin  / ER + walter / [place] -walter / Ki 67 5-10% diagnosed in 2018 at [facility] on s/p lumpectomy and RT, currently on Letrozole, started in May, 2018. Discussed in detail with patient about treatment and surveillance of DCIS. She was told that endocrine therapy will be for 5 years in total .\par \par Patient has dysphagia to solids, chronic likely from Achalasia. She had esophagram with Dr Gomez which she did not tolerate and was not completed. She did not go for repeat testing.\par \par Mammogram on 2/7/23 No evidence of malignancies.\par Bone density on 3/7/23 showed osteopenia on the spine, femur and hip.\par \par RECOMMENDATION:\par Previous notes reviewed and all relevant radiology results discussed with Dr Dave and communicated to the patient.\par \par -- Continue Letrozole, patient will complete 5 years treatment is up on  5/30/23.\par She started on Prolia in Dec, 2022 with PMD. \par  Breast exam today did not reveal palpable breast mass or axillary adenopathy. She will have repeat annua mammogram in March, 2024. She will continue Vitamin D and Calcium.\par -- Continue with daily exercise , she reports walking every day. \par -- Continue to follow up with GYN,  PCP for health maintenance.\par -- Follow up with GI for further evaluation of dysphagia and colonoscopy.\par \par #Osteopenia\par -- She started  Prolia 60 mg SQ every 6 months in PMD office. \par -- Emphasized weight bearing exercises.\par -- Continue Vitamin D and Calcium.\par -- Communicated Bone density result done in due on 3/7/23 with showed osteopenia on the spine, femur and hip. \par \par RTC in 6 months\par Follow up appointment provided. \par

## 2023-03-28 NOTE — PHYSICAL EXAM
[Fully active, able to carry on all pre-disease performance without restriction] : Status 0 - Fully active, able to carry on all pre-disease performance without restriction [Normal] : grossly intact [de-identified] : bilateral breasts with no palpable masses, well healed scar on right breast, no palpable axillary lymphadenopathy.

## 2023-03-29 DIAGNOSIS — Z79.811 LONG TERM (CURRENT) USE OF AROMATASE INHIBITORS: ICD-10-CM

## 2023-03-29 DIAGNOSIS — Z85.3 PERSONAL HISTORY OF MALIGNANT NEOPLASM OF BREAST: ICD-10-CM

## 2023-03-29 DIAGNOSIS — M85.852 OTHER SPECIFIED DISORDERS OF BONE DENSITY AND STRUCTURE, LEFT THIGH: ICD-10-CM

## 2023-03-29 DIAGNOSIS — M85.88 OTHER SPECIFIED DISORDERS OF BONE DENSITY AND STRUCTURE, OTHER SITE: ICD-10-CM

## 2023-04-08 ENCOUNTER — NON-APPOINTMENT (OUTPATIENT)
Age: 58
End: 2023-04-08

## 2023-04-26 ENCOUNTER — NON-APPOINTMENT (OUTPATIENT)
Age: 58
End: 2023-04-26

## 2023-05-02 ENCOUNTER — APPOINTMENT (OUTPATIENT)
Dept: OBGYN | Facility: CLINIC | Age: 58
End: 2023-05-02
Payer: COMMERCIAL

## 2023-05-02 VITALS
DIASTOLIC BLOOD PRESSURE: 70 MMHG | WEIGHT: 155 LBS | OXYGEN SATURATION: 98 % | SYSTOLIC BLOOD PRESSURE: 120 MMHG | TEMPERATURE: 98.3 F | HEART RATE: 74 BPM | BODY MASS INDEX: 30.43 KG/M2 | HEIGHT: 60 IN

## 2023-05-02 DIAGNOSIS — Z01.419 ENCOUNTER FOR GYNECOLOGICAL EXAMINATION (GENERAL) (ROUTINE) W/OUT ABNORMAL FINDINGS: ICD-10-CM

## 2023-05-02 DIAGNOSIS — Z11.3 ENCOUNTER FOR SCREENING FOR INFECTIONS WITH A PREDOMINANTLY SEXUAL MODE OF TRANSMISSION: ICD-10-CM

## 2023-05-02 LAB
BILIRUB UR QL STRIP: NEGATIVE
CLARITY UR: CLEAR
COLLECTION METHOD: NORMAL
GLUCOSE UR-MCNC: NEGATIVE
HCG UR QL: 0.2 EU/DL
HGB UR QL STRIP.AUTO: NEGATIVE
KETONES UR-MCNC: NEGATIVE
LEUKOCYTE ESTERASE UR QL STRIP: NORMAL
NITRITE UR QL STRIP: NEGATIVE
PH UR STRIP: 6
PROT UR STRIP-MCNC: NEGATIVE
SP GR UR STRIP: 1.01

## 2023-05-02 PROCEDURE — 99396 PREV VISIT EST AGE 40-64: CPT

## 2023-05-02 PROCEDURE — 81003 URINALYSIS AUTO W/O SCOPE: CPT | Mod: QW

## 2023-05-02 NOTE — PROCEDURE
[Cervical Pap Smear] : cervical Pap smear [Liquid Base] : liquid base [GC & Chlamydia via Pap] : GC & Chlamydia via Pap [Tolerated Well] : the patient tolerated the procedure well [No Complications] : there were no complications [de-identified] : HPV DONE

## 2023-05-02 NOTE — PHYSICAL EXAM
[Chaperone Present] : A chaperone was present in the examining room during all aspects of the physical examination [FreeTextEntry1] : DAVIDE MORRISSEY MA/ DOMINIK [Appropriately responsive] : appropriately responsive [Alert] : alert [No Acute Distress] : no acute distress [No Lymphadenopathy] : no lymphadenopathy [Regular Rate Rhythm] : regular rate rhythm [No Murmurs] : no murmurs [Clear to Auscultation B/L] : clear to auscultation bilaterally [Soft] : soft [Non-tender] : non-tender [Non-distended] : non-distended [No HSM] : No HSM [No Lesions] : no lesions [No Mass] : no mass [Oriented x3] : oriented x3 [Examination Of The Breasts] : a normal appearance [No Masses] : no breast masses were palpable [Labia Majora] : normal [Labia Minora] : normal [Normal] : normal [Uterine Adnexae] : normal

## 2023-05-02 NOTE — HISTORY OF PRESENT ILLNESS
[LMP unknown] : LMP unknown [Ultrasound] : ultrasound [Yes] : pregnancy [Gonorrhea test offered] : Gonorrhea test offered [Chlamydia test offered] : Chlamydia test offered [Trichomonas test offered] : Trichomonas test offered [HPV test offered] : HPV test offered [TextBox_4] : PATIENT PRESENT FOR WELL WOMAN EXAM [Mammogramdate] : 3/7/23 [BreastSonogramDate] : 1/12/21 [PapSmeardate] : 5/18/22 [BoneDensityDate] : 3/2/21 [ColonoscopyDate] : 2021 [LMPDate] : 10/31/13 [PGHxTotal] : 2 [[facility] : 2 [TextBox_27] : TVS/ PELVIC US- 6/13/20 [TextBox_6] : 10/31/13 [FreeTextEntry1] : 10/31/13

## 2023-07-23 ENCOUNTER — NON-APPOINTMENT (OUTPATIENT)
Age: 58
End: 2023-07-23

## 2023-09-09 ENCOUNTER — RESULT REVIEW (OUTPATIENT)
Age: 58
End: 2023-09-09

## 2023-09-09 ENCOUNTER — OUTPATIENT (OUTPATIENT)
Dept: OUTPATIENT SERVICES | Facility: HOSPITAL | Age: 58
LOS: 1 days | End: 2023-09-09
Payer: COMMERCIAL

## 2023-09-09 DIAGNOSIS — Z00.8 ENCOUNTER FOR OTHER GENERAL EXAMINATION: ICD-10-CM

## 2023-09-09 DIAGNOSIS — Z98.51 TUBAL LIGATION STATUS: Chronic | ICD-10-CM

## 2023-09-09 DIAGNOSIS — Z98.890 OTHER SPECIFIED POSTPROCEDURAL STATES: Chronic | ICD-10-CM

## 2023-09-09 DIAGNOSIS — F17.200 NICOTINE DEPENDENCE, UNSPECIFIED, UNCOMPLICATED: ICD-10-CM

## 2023-09-09 DIAGNOSIS — N60.09 SOLITARY CYST OF UNSPECIFIED BREAST: Chronic | ICD-10-CM

## 2023-09-09 PROCEDURE — 71250 CT THORAX DX C-: CPT | Mod: 26

## 2023-09-09 PROCEDURE — 71250 CT THORAX DX C-: CPT

## 2023-09-10 DIAGNOSIS — F17.200 NICOTINE DEPENDENCE, UNSPECIFIED, UNCOMPLICATED: ICD-10-CM

## 2023-09-13 LAB
C TRACH RRNA SPEC QL NAA+PROBE: NOT DETECTED
CYTOLOGY CVX/VAG DOC THIN PREP: ABNORMAL
HPV HIGH+LOW RISK DNA PNL CVX: NOT DETECTED
N GONORRHOEA RRNA SPEC QL NAA+PROBE: NOT DETECTED
SOURCE AMPLIFICATION: NORMAL
SOURCE AMPLIFICATION: NORMAL
T VAGINALIS RRNA SPEC QL NAA+PROBE: NOT DETECTED

## 2023-10-02 ENCOUNTER — OUTPATIENT (OUTPATIENT)
Dept: OUTPATIENT SERVICES | Facility: HOSPITAL | Age: 58
LOS: 1 days | End: 2023-10-02
Payer: COMMERCIAL

## 2023-10-02 ENCOUNTER — APPOINTMENT (OUTPATIENT)
Dept: HEMATOLOGY ONCOLOGY | Facility: CLINIC | Age: 58
End: 2023-10-02
Payer: COMMERCIAL

## 2023-10-02 VITALS
WEIGHT: 155 LBS | SYSTOLIC BLOOD PRESSURE: 138 MMHG | BODY MASS INDEX: 30.43 KG/M2 | HEART RATE: 75 BPM | DIASTOLIC BLOOD PRESSURE: 63 MMHG | TEMPERATURE: 97.2 F | HEIGHT: 60 IN

## 2023-10-02 DIAGNOSIS — Z85.3 PERSONAL HISTORY OF MALIGNANT NEOPLASM OF BREAST: ICD-10-CM

## 2023-10-02 DIAGNOSIS — Z98.890 OTHER SPECIFIED POSTPROCEDURAL STATES: Chronic | ICD-10-CM

## 2023-10-02 DIAGNOSIS — M85.88 OTHER SPECIFIED DISORDERS OF BONE DENSITY AND STRUCTURE, OTHER SITE: ICD-10-CM

## 2023-10-02 DIAGNOSIS — N60.09 SOLITARY CYST OF UNSPECIFIED BREAST: Chronic | ICD-10-CM

## 2023-10-02 DIAGNOSIS — Z98.51 TUBAL LIGATION STATUS: Chronic | ICD-10-CM

## 2023-10-02 DIAGNOSIS — M85.852 OTHER SPECIFIED DISORDERS OF BONE DENSITY AND STRUCTURE, LEFT THIGH: ICD-10-CM

## 2023-10-02 DIAGNOSIS — Z79.811 LONG TERM (CURRENT) USE OF AROMATASE INHIBITORS: ICD-10-CM

## 2023-10-02 PROCEDURE — 99214 OFFICE O/P EST MOD 30 MIN: CPT

## 2023-10-24 ENCOUNTER — NON-APPOINTMENT (OUTPATIENT)
Age: 58
End: 2023-10-24

## 2024-04-11 ENCOUNTER — NON-APPOINTMENT (OUTPATIENT)
Age: 59
End: 2024-04-11

## 2024-05-22 ENCOUNTER — APPOINTMENT (OUTPATIENT)
Dept: OBGYN | Facility: CLINIC | Age: 59
End: 2024-05-22

## 2024-09-10 ENCOUNTER — LABORATORY RESULT (OUTPATIENT)
Age: 59
End: 2024-09-10

## 2024-09-11 ENCOUNTER — APPOINTMENT (OUTPATIENT)
Dept: OBGYN | Facility: CLINIC | Age: 59
End: 2024-09-11
Payer: COMMERCIAL

## 2024-09-11 VITALS
DIASTOLIC BLOOD PRESSURE: 86 MMHG | SYSTOLIC BLOOD PRESSURE: 112 MMHG | BODY MASS INDEX: 31.41 KG/M2 | HEIGHT: 60 IN | TEMPERATURE: 97.9 F | OXYGEN SATURATION: 98 % | HEART RATE: 77 BPM | WEIGHT: 160 LBS

## 2024-09-11 DIAGNOSIS — Z01.419 ENCOUNTER FOR GYNECOLOGICAL EXAMINATION (GENERAL) (ROUTINE) W/OUT ABNORMAL FINDINGS: ICD-10-CM

## 2024-09-11 DIAGNOSIS — Z13.29 ENCOUNTER FOR SCREENING FOR OTHER SUSPECTED ENDOCRINE DISORDER: ICD-10-CM

## 2024-09-11 DIAGNOSIS — Z11.51 ENCOUNTER FOR SCREENING FOR HUMAN PAPILLOMAVIRUS (HPV): ICD-10-CM

## 2024-09-11 DIAGNOSIS — Z11.3 ENCOUNTER FOR SCREENING FOR INFECTIONS WITH A PREDOMINANTLY SEXUAL MODE OF TRANSMISSION: ICD-10-CM

## 2024-09-11 DIAGNOSIS — B97.7 PAPILLOMAVIRUS AS THE CAUSE OF DISEASES CLASSIFIED ELSEWHERE: ICD-10-CM

## 2024-09-11 DIAGNOSIS — Z85.3 PERSONAL HISTORY OF MALIGNANT NEOPLASM OF BREAST: ICD-10-CM

## 2024-09-11 LAB
BILIRUB UR QL STRIP: NORMAL
CLARITY UR: CLEAR
COLLECTION METHOD: NORMAL
GLUCOSE UR-MCNC: NORMAL
HCG UR QL: 0.2 EU/DL
HGB UR QL STRIP.AUTO: NORMAL
KETONES UR-MCNC: NORMAL
LEUKOCYTE ESTERASE UR QL STRIP: NORMAL
NITRITE UR QL STRIP: NORMAL
PH UR STRIP: 5.5
PROT UR STRIP-MCNC: NORMAL
SP GR UR STRIP: 1.02

## 2024-09-11 PROCEDURE — 99459 PELVIC EXAMINATION: CPT

## 2024-09-11 PROCEDURE — 81003 URINALYSIS AUTO W/O SCOPE: CPT | Mod: QW

## 2024-09-11 PROCEDURE — 99396 PREV VISIT EST AGE 40-64: CPT

## 2024-09-13 LAB
C TRACH RRNA SPEC QL NAA+PROBE: NOT DETECTED
HPV HIGH+LOW RISK DNA PNL CVX: DETECTED
N GONORRHOEA RRNA SPEC QL NAA+PROBE: NOT DETECTED
SOURCE AMPLIFICATION: NORMAL
SOURCE AMPLIFICATION: NORMAL
T VAGINALIS RRNA SPEC QL NAA+PROBE: NOT DETECTED

## 2024-09-14 LAB
25(OH)D3 SERPL-MCNC: 35 NG/ML
ALBUMIN SERPL ELPH-MCNC: 4.6 G/DL
ALP BLD-CCNC: 73 U/L
ALT SERPL-CCNC: 18 U/L
AST SERPL-CCNC: 17 U/L
BILIRUB DIRECT SERPL-MCNC: <0.2 MG/DL
BILIRUB INDIRECT SERPL-MCNC: >0.3 MG/DL
BILIRUB SERPL-MCNC: 0.5 MG/DL
CHOLEST SERPL-MCNC: 172 MG/DL
HDLC SERPL-MCNC: 41 MG/DL
LDLC SERPL CALC-MCNC: 115 MG/DL
NONHDLC SERPL-MCNC: 131 MG/DL
PROT SERPL-MCNC: 6.8 G/DL
TRIGL SERPL-MCNC: 82 MG/DL
TSH SERPL-ACNC: 1.44 UIU/ML

## 2024-10-08 ENCOUNTER — APPOINTMENT (OUTPATIENT)
Dept: HEMATOLOGY ONCOLOGY | Facility: CLINIC | Age: 59
End: 2024-10-08

## 2024-10-29 ENCOUNTER — APPOINTMENT (OUTPATIENT)
Age: 59
End: 2024-10-29
Payer: COMMERCIAL

## 2024-10-29 ENCOUNTER — OUTPATIENT (OUTPATIENT)
Dept: OUTPATIENT SERVICES | Facility: HOSPITAL | Age: 59
LOS: 1 days | End: 2024-10-29
Payer: COMMERCIAL

## 2024-10-29 VITALS
SYSTOLIC BLOOD PRESSURE: 120 MMHG | TEMPERATURE: 97.8 F | BODY MASS INDEX: 30.67 KG/M2 | HEIGHT: 60 IN | HEART RATE: 80 BPM | WEIGHT: 156.25 LBS | OXYGEN SATURATION: 97 % | DIASTOLIC BLOOD PRESSURE: 77 MMHG

## 2024-10-29 DIAGNOSIS — Z85.3 PERSONAL HISTORY OF MALIGNANT NEOPLASM OF BREAST: ICD-10-CM

## 2024-10-29 DIAGNOSIS — Z98.51 TUBAL LIGATION STATUS: Chronic | ICD-10-CM

## 2024-10-29 DIAGNOSIS — N60.09 SOLITARY CYST OF UNSPECIFIED BREAST: Chronic | ICD-10-CM

## 2024-10-29 DIAGNOSIS — M85.88 OTHER SPECIFIED DISORDERS OF BONE DENSITY AND STRUCTURE, OTHER SITE: ICD-10-CM

## 2024-10-29 DIAGNOSIS — Z98.890 OTHER SPECIFIED POSTPROCEDURAL STATES: Chronic | ICD-10-CM

## 2024-10-29 DIAGNOSIS — M85.852 OTHER SPECIFIED DISORDERS OF BONE DENSITY AND STRUCTURE, LEFT THIGH: ICD-10-CM

## 2024-10-29 PROCEDURE — 99214 OFFICE O/P EST MOD 30 MIN: CPT

## 2024-10-30 DIAGNOSIS — Z85.3 PERSONAL HISTORY OF MALIGNANT NEOPLASM OF BREAST: ICD-10-CM

## 2025-06-12 ENCOUNTER — NON-APPOINTMENT (OUTPATIENT)
Age: 60
End: 2025-06-12

## 2025-09-15 ENCOUNTER — NON-APPOINTMENT (OUTPATIENT)
Age: 60
End: 2025-09-15

## 2025-09-17 ENCOUNTER — APPOINTMENT (OUTPATIENT)
Dept: OBGYN | Facility: CLINIC | Age: 60
End: 2025-09-17
Payer: COMMERCIAL

## 2025-09-17 VITALS
TEMPERATURE: 98 F | BODY MASS INDEX: 26.41 KG/M2 | SYSTOLIC BLOOD PRESSURE: 114 MMHG | OXYGEN SATURATION: 99 % | DIASTOLIC BLOOD PRESSURE: 62 MMHG | WEIGHT: 131 LBS | HEART RATE: 92 BPM | HEIGHT: 59 IN

## 2025-09-17 DIAGNOSIS — Z11.3 ENCOUNTER FOR SCREENING FOR INFECTIONS WITH A PREDOMINANTLY SEXUAL MODE OF TRANSMISSION: ICD-10-CM

## 2025-09-17 DIAGNOSIS — Z01.419 ENCOUNTER FOR GYNECOLOGICAL EXAMINATION (GENERAL) (ROUTINE) W/OUT ABNORMAL FINDINGS: ICD-10-CM

## 2025-09-17 PROCEDURE — 99459 PELVIC EXAMINATION: CPT

## 2025-09-17 PROCEDURE — 81003 URINALYSIS AUTO W/O SCOPE: CPT | Mod: QW

## 2025-09-17 PROCEDURE — 99396 PREV VISIT EST AGE 40-64: CPT

## 2025-09-19 LAB
C TRACH RRNA SPEC QL NAA+PROBE: NOT DETECTED
HPV HIGH+LOW RISK DNA PNL CVX: NOT DETECTED
N GONORRHOEA RRNA SPEC QL NAA+PROBE: NOT DETECTED
SOURCE AMPLIFICATION: NORMAL

## 2025-09-20 LAB
SOURCE AMPLIFICATION: NORMAL
T VAGINALIS RRNA SPEC QL NAA+PROBE: NOT DETECTED